# Patient Record
Sex: MALE
[De-identification: names, ages, dates, MRNs, and addresses within clinical notes are randomized per-mention and may not be internally consistent; named-entity substitution may affect disease eponyms.]

---

## 2022-09-03 ENCOUNTER — NURSE TRIAGE (OUTPATIENT)
Dept: OTHER | Facility: CLINIC | Age: 22
End: 2022-09-03

## 2022-09-03 NOTE — TELEPHONE ENCOUNTER
Caller wants to know if he should be seen after a bird or bat landed on his head. States that there was a whole in his hat but no open area on his head. No scratches noted. No triage. Reason for Disposition   General information question, no triage required and triager able to answer question    Protocols used:  Information Only Call - No Triage-ADULT-